# Patient Record
Sex: MALE | Race: WHITE | Employment: OTHER | URBAN - METROPOLITAN AREA
[De-identification: names, ages, dates, MRNs, and addresses within clinical notes are randomized per-mention and may not be internally consistent; named-entity substitution may affect disease eponyms.]

---

## 2022-03-08 ENCOUNTER — TELEPHONE (OUTPATIENT)
Dept: CARDIAC CATH/INVASIVE PROCEDURES | Age: 57
End: 2022-03-08

## 2022-03-08 NOTE — TELEPHONE ENCOUNTER
Attempted to reminded patient of scheduled procedure on 3/9  No iInstructions given could not leave message

## 2022-03-09 ENCOUNTER — HOSPITAL ENCOUNTER (OUTPATIENT)
Dept: CARDIAC CATH/INVASIVE PROCEDURES | Age: 57
Discharge: HOME | End: 2022-03-09
Payer: COMMERCIAL

## 2022-03-09 VITALS
SYSTOLIC BLOOD PRESSURE: 151 MMHG | OXYGEN SATURATION: 100 % | DIASTOLIC BLOOD PRESSURE: 88 MMHG | WEIGHT: 185 LBS | TEMPERATURE: 97.6 F | HEIGHT: 68 IN | HEART RATE: 65 BPM | BODY MASS INDEX: 28.04 KG/M2 | RESPIRATION RATE: 20 BRPM

## 2022-03-09 LAB
ABO/RH: NORMAL
ANTIBODY SCREEN: NORMAL

## 2022-03-09 PROCEDURE — 6360000002 HC RX W HCPCS

## 2022-03-09 PROCEDURE — 86901 BLOOD TYPING SEROLOGIC RH(D): CPT

## 2022-03-09 PROCEDURE — 86900 BLOOD TYPING SEROLOGIC ABO: CPT

## 2022-03-09 PROCEDURE — 2709999900 HC NON-CHARGEABLE SUPPLY

## 2022-03-09 PROCEDURE — C1760 CLOSURE DEV, VASC: HCPCS

## 2022-03-09 PROCEDURE — 2500000003 HC RX 250 WO HCPCS

## 2022-03-09 PROCEDURE — C1769 GUIDE WIRE: HCPCS

## 2022-03-09 PROCEDURE — 93459 L HRT ART/GRFT ANGIO: CPT | Performed by: INTERNAL MEDICINE

## 2022-03-09 PROCEDURE — 93459 L HRT ART/GRFT ANGIO: CPT

## 2022-03-09 PROCEDURE — C1894 INTRO/SHEATH, NON-LASER: HCPCS

## 2022-03-09 PROCEDURE — 6370000000 HC RX 637 (ALT 250 FOR IP): Performed by: INTERNAL MEDICINE

## 2022-03-09 PROCEDURE — 86850 RBC ANTIBODY SCREEN: CPT

## 2022-03-09 PROCEDURE — 36415 COLL VENOUS BLD VENIPUNCTURE: CPT

## 2022-03-09 RX ORDER — SODIUM CHLORIDE 9 MG/ML
INJECTION, SOLUTION INTRAVENOUS CONTINUOUS
Status: DISCONTINUED | OUTPATIENT
Start: 2022-03-09 | End: 2022-03-10 | Stop reason: HOSPADM

## 2022-03-09 RX ORDER — SODIUM CHLORIDE 0.9 % (FLUSH) 0.9 %
5-40 SYRINGE (ML) INJECTION EVERY 12 HOURS SCHEDULED
Status: DISCONTINUED | OUTPATIENT
Start: 2022-03-09 | End: 2022-03-10 | Stop reason: HOSPADM

## 2022-03-09 RX ORDER — SODIUM CHLORIDE 9 MG/ML
25 INJECTION, SOLUTION INTRAVENOUS PRN
Status: DISCONTINUED | OUTPATIENT
Start: 2022-03-09 | End: 2022-03-10 | Stop reason: HOSPADM

## 2022-03-09 RX ORDER — ATORVASTATIN CALCIUM 80 MG/1
80 TABLET, FILM COATED ORAL DAILY
COMMUNITY

## 2022-03-09 RX ORDER — SODIUM CHLORIDE 0.9 % (FLUSH) 0.9 %
5-40 SYRINGE (ML) INJECTION PRN
Status: DISCONTINUED | OUTPATIENT
Start: 2022-03-09 | End: 2022-03-10 | Stop reason: HOSPADM

## 2022-03-09 RX ORDER — ASPIRIN 325 MG
325 TABLET ORAL ONCE
Status: COMPLETED | OUTPATIENT
Start: 2022-03-09 | End: 2022-03-09

## 2022-03-09 RX ORDER — ASPIRIN 81 MG/1
81 TABLET ORAL DAILY
COMMUNITY

## 2022-03-09 RX ORDER — METOPROLOL SUCCINATE 100 MG/1
50 TABLET, EXTENDED RELEASE ORAL DAILY
COMMUNITY

## 2022-03-09 RX ADMIN — ASPIRIN 325 MG: 325 TABLET ORAL at 06:49

## 2022-03-09 NOTE — PROCEDURES
[address]                  [address] [id number],  [facility]                            CARDIAC CATHETERIZATION    PATIENT NAME: Luis E Ramsay                      :        1965  MED REC NO:   [id number]                            ROOM:  ACCOUNT NO:   [de-identified]                           ADMIT DATE: 2022  PROVIDER:     Kimberly Snow MD    DATE OF PROCEDURE:  2022    PROCEDURE:  Left heart catheterization, selective coronary angiography,  graft angiography x4, left ventriculography, right femoral artery  angiography and closure of the access site with Angio-Seal device. The procedure was done through right radial femoral artery approach  using ultrasound guidance. The patient received intravenous Versed and intravenous fentanyl for  sedation. INDICATION:  Chest discomfort and shortness of breath in a patient with  known coronary artery disease with a history of coronary artery bypass  graft surgery. AUC:  7-54. PRESSURES:  1. Aorta 169/95 with a mean of 125.  2.  Left ventricular systolic pressure 225. Left ventricle  end-diastolic pressure 27.  3.  There was no significant gradient across the aortic valve on  pullback. CORONARY ANGIOGRAPHY:  1. Left main. The left main artery is a small caliber vessel which did  not appear to have any discrete lesions. 2. LAD. Left anterior descending artery is functionally occluded at  the site of the second septal  branch. A very small caliber  diagonal branch opacified faintly. 3. LCX. The left circumflex is calcified and severely diffusely  diseased in its proximal segment with up to 95% luminal narrowing. A  small marginal branch did not appear to have any significant disease. The left circumflex provided collaterals to the right coronary artery. 4.  RCA. The right coronary artery is occluded at its origin. 5.  SVG to RCA:  Occluded.   6.  SVG to diagonal branch:  Occluded. 7.  VINOD to LCX OM:  The right internal mammary artery to the obtuse  marginal branch of the left circumflex is widely patent along its course  including its distal anastomosis. The marginal branch after the distal  anastomosis widely patent. The VNIOD also retrogradely filled another  marginal branch. Again, the marginal branches appeared to provide  collaterals to the distal RCA. 8.  LIMA to LAD:  The left internal mammary artery to the left anterior  descending artery is widely patent along its course including its distal  anastomosis. The LAD after the distal anastomosis is patent and also  provided collaterals to the distal RCA/PDA. There was also a faint  filling of the diagonal branch. LEFT VENTRICULOGRAPHY:  The left ventricle is normal in size and  contractility with an estimated ejection fraction of 55%. There was no  regional wall motion abnormality noted on the 35-degree SLATER view. RIGHT FEMORAL ARTERY ANGIOGRAPHY:  The right common femoral artery and  the proximal segments of the right superficial femoral artery and the  right profunda did not appear to have any significant disease. Closure  of the access site was performed with Angio-Seal device with achievement  of adequate hemostasis. The patient tolerated the procedure well and left the cardiac  catheterization laboratory in stable condition. Estimated blood loss = 10 cc    CONCLUSIONS:  1. Coronary artery disease. a. Left main, small caliber vessel with no discrete lesions. b.  LAD, occluded. c.  LCX, calcified 99% proximal vessel occlusion. d.  RCA, occluded at its origin. e.  ASHFORD to LAD, widely patent with the LAD providing collaterals to the  distal RCA/PDA. f.  VINOD to LCX OM, widely patent with the marginal branches providing  collaterals to distal RCA. g.  SVG to diagonal branch, occluded.  h.  SVG to RCA, occluded.   2.  Normal left ventricular size with no regional wall motion  abnormality noted on the 35-degree SLATER view with an estimated ejection  fraction of 55%. 3.  Systemic hypertension. 4.  Elevated left ventricular end-diastolic pressure consistent with  diastolic dysfunction. 5.  Closure of the right femoral artery access site with Angio-Seal  device.           Dao Yanez MD    D: 03/09/2022 9:10:09       T: 03/09/2022 9:12:33     [email or web address]  Job#: [id number]     Doc#: [id number]    CC: